# Patient Record
Sex: MALE | Race: WHITE | URBAN - METROPOLITAN AREA
[De-identification: names, ages, dates, MRNs, and addresses within clinical notes are randomized per-mention and may not be internally consistent; named-entity substitution may affect disease eponyms.]

---

## 2024-01-08 ENCOUNTER — APPOINTMENT (OUTPATIENT)
Dept: URBAN - METROPOLITAN AREA CLINIC 233 | Age: 69
Setting detail: DERMATOLOGY
End: 2024-01-08

## 2024-01-08 DIAGNOSIS — L82.1 OTHER SEBORRHEIC KERATOSIS: ICD-10-CM

## 2024-01-08 DIAGNOSIS — L20.89 OTHER ATOPIC DERMATITIS: ICD-10-CM

## 2024-01-08 DIAGNOSIS — L259 CONTACT DERMATITIS AND OTHER ECZEMA, UNSPECIFIED CAUSE: ICD-10-CM

## 2024-01-08 DIAGNOSIS — L81.4 OTHER MELANIN HYPERPIGMENTATION: ICD-10-CM

## 2024-01-08 PROBLEM — L30.9 DERMATITIS, UNSPECIFIED: Status: ACTIVE | Noted: 2024-01-08

## 2024-01-08 PROBLEM — L30.8 OTHER SPECIFIED DERMATITIS: Status: ACTIVE | Noted: 2024-01-08

## 2024-01-08 PROCEDURE — OTHER INTRAMUSCULAR KENALOG: OTHER

## 2024-01-08 PROCEDURE — 96372 THER/PROPH/DIAG INJ SC/IM: CPT | Mod: 59

## 2024-01-08 PROCEDURE — OTHER COUNSELING: OTHER

## 2024-01-08 PROCEDURE — OTHER PRESCRIPTION: OTHER

## 2024-01-08 PROCEDURE — 11102 TANGNTL BX SKIN SINGLE LES: CPT

## 2024-01-08 PROCEDURE — 99203 OFFICE O/P NEW LOW 30 MIN: CPT | Mod: 25

## 2024-01-08 PROCEDURE — OTHER MIPS QUALITY: OTHER

## 2024-01-08 PROCEDURE — OTHER BIOPSY BY SHAVE METHOD: OTHER

## 2024-01-08 RX ORDER — TRIAMCINOLONE ACETONIDE 1 MG/G
OINTMENT TOPICAL
Qty: 80 | Refills: 0 | Status: ERX | COMMUNITY
Start: 2024-01-08

## 2024-01-08 RX ORDER — PREDNISONE 10 MG/1
TABLET ORAL
Qty: 35 | Refills: 0 | Status: ERX | COMMUNITY
Start: 2024-01-08

## 2024-01-08 RX ORDER — HYDROXYZINE HYDROCHLORIDE 25 MG/1
TABLET, FILM COATED ORAL
Qty: 60 | Refills: 2 | Status: ERX | COMMUNITY
Start: 2024-01-08

## 2024-01-08 ASSESSMENT — LOCATION SIMPLE DESCRIPTION DERM
LOCATION SIMPLE: LEFT BUTTOCK
LOCATION SIMPLE: CHEST

## 2024-01-08 ASSESSMENT — LOCATION DETAILED DESCRIPTION DERM
LOCATION DETAILED: LEFT BUTTOCK
LOCATION DETAILED: LEFT LATERAL INFERIOR CHEST

## 2024-01-08 ASSESSMENT — LOCATION ZONE DERM: LOCATION ZONE: TRUNK

## 2024-01-08 NOTE — PROCEDURE: INTRAMUSCULAR KENALOG
Detail Level: None
Consent: The risks of atrophy were reviewed with the patient.
Add Option For Additional Mediation: No
Kenalog Preparation: kenalog
Concentration (Mg/Ml) Of Additional Medication: 2.5
Concentration (Mg/Ml): 40.0
Ndc# (Optional): 86574|1169|1
Total Volume (Ccs): 1

## 2024-01-08 NOTE — PROCEDURE: BIOPSY BY SHAVE METHOD
Detail Level: Detailed
Depth Of Biopsy: dermis
Was A Bandage Applied: Yes
Size Of Lesion In Cm: 0
Biopsy Type: H and E
Biopsy Method: Personna blade
Anesthesia Type: 1% lidocaine with epinephrine
Anesthesia Volume In Cc: 0.5
Hemostasis: Drysol
Wound Care: Petrolatum
Dressing: bandage
Destruction After The Procedure: No
Type Of Destruction Used: Curettage
Curettage Text: The wound bed was treated with curettage after the biopsy was performed.
Cryotherapy Text: The wound bed was treated with cryotherapy after the biopsy was performed.
Electrodesiccation Text: The wound bed was treated with electrodesiccation after the biopsy was performed.
Electrodesiccation And Curettage Text: The wound bed was treated with electrodesiccation and curettage after the biopsy was performed.
Silver Nitrate Text: The wound bed was treated with silver nitrate after the biopsy was performed.
Lab: [id number]
Consent: Written consent was obtained and risks were reviewed including but not limited to scarring, infection, bleeding, scabbing, incomplete removal, nerve damage and allergy to anesthesia.
Post-Care Instructions: I reviewed with the patient in detail post-care instructions. Patient is to keep the biopsy site dry overnight, and then apply bacitracin twice daily until healed. Patient may apply hydrogen peroxide soaks to remove any crusting.
Notification Instructions: Patient will be notified of biopsy results. However, patient instructed to call the office if not contacted within 2 weeks.
Billing Type: Third-Party Bill
Information: Selecting Yes will display possible errors in your note based on the variables you have selected. This validation is only offered as a suggestion for you. PLEASE NOTE THAT THE VALIDATION TEXT WILL BE REMOVED WHEN YOU FINALIZE YOUR NOTE. IF YOU WANT TO FAX A PRELIMINARY NOTE YOU WILL NEED TO TOGGLE THIS TO 'NO' IF YOU DO NOT WANT IT IN YOUR FAXED NOTE.

## 2024-04-02 ENCOUNTER — RX ONLY (RX ONLY)
Age: 69
End: 2024-04-02

## 2024-04-02 RX ORDER — PREDNISONE 10 MG/1
TABLET ORAL
Qty: 35 | Refills: 0 | Status: ERX

## 2024-06-26 ENCOUNTER — APPOINTMENT (OUTPATIENT)
Dept: URBAN - METROPOLITAN AREA CLINIC 233 | Age: 69
Setting detail: DERMATOLOGY
End: 2024-06-26

## 2024-06-26 DIAGNOSIS — L259 CONTACT DERMATITIS AND OTHER ECZEMA, UNSPECIFIED CAUSE: ICD-10-CM

## 2024-06-26 PROBLEM — L30.8 OTHER SPECIFIED DERMATITIS: Status: ACTIVE | Noted: 2024-06-26

## 2024-06-26 PROCEDURE — OTHER COUNSELING: OTHER

## 2024-06-26 PROCEDURE — 99213 OFFICE O/P EST LOW 20 MIN: CPT

## 2024-06-26 PROCEDURE — OTHER MIPS QUALITY: OTHER

## 2024-06-26 PROCEDURE — OTHER PRESCRIPTION: OTHER

## 2024-06-26 RX ORDER — LORATADINE 10 MG/1
TABLET ORAL
Qty: 60 | Refills: 3 | Status: ERX | COMMUNITY
Start: 2024-06-26

## 2024-06-26 RX ORDER — BETAMETHASONE DIPROPIONATE 0.5 MG/G
OINTMENT, AUGMENTED TOPICAL
Qty: 50 | Refills: 3 | Status: ERX | COMMUNITY
Start: 2024-06-26

## 2024-09-04 ENCOUNTER — RX ONLY (RX ONLY)
Age: 69
End: 2024-09-04

## 2024-09-04 RX ORDER — PREDNISONE 10 MG/1
TABLET ORAL
Qty: 35 | Refills: 0 | Status: ERX